# Patient Record
Sex: FEMALE | Race: WHITE | Employment: STUDENT | URBAN - NONMETROPOLITAN AREA
[De-identification: names, ages, dates, MRNs, and addresses within clinical notes are randomized per-mention and may not be internally consistent; named-entity substitution may affect disease eponyms.]

---
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
## 2023-06-15 ENCOUNTER — APPOINTMENT (OUTPATIENT)
Dept: GENERAL RADIOLOGY | Age: 18
End: 2023-06-15
Payer: COMMERCIAL

## 2023-06-15 ENCOUNTER — HOSPITAL ENCOUNTER (EMERGENCY)
Age: 18
Discharge: HOME | End: 2023-06-15
Attending: EMERGENCY MEDICINE
Payer: COMMERCIAL

## 2023-06-15 VITALS
OXYGEN SATURATION: 96 % | DIASTOLIC BLOOD PRESSURE: 89 MMHG | HEART RATE: 93 BPM | RESPIRATION RATE: 18 BRPM | SYSTOLIC BLOOD PRESSURE: 137 MMHG | TEMPERATURE: 98.2 F

## 2023-06-15 DIAGNOSIS — S83.92XA SPRAIN OF LEFT KNEE, UNSPECIFIED LIGAMENT, INITIAL ENCOUNTER: Primary | ICD-10-CM

## 2023-06-15 PROCEDURE — 73564 X-RAY EXAM KNEE 4 OR MORE: CPT

## 2023-06-15 PROCEDURE — 99283 EMERGENCY DEPT VISIT LOW MDM: CPT

## 2023-06-15 RX ORDER — IBUPROFEN 600 MG/1
600 TABLET ORAL EVERY 8 HOURS PRN
Qty: 30 TABLET | Refills: 0 | Status: SHIPPED | OUTPATIENT
Start: 2023-06-15

## 2023-06-16 NOTE — DISCHARGE INSTRUCTIONS
Return to ED if you have any return of symptoms such as knee swelling, redness, pain, leg swelling, difficulty breathing, or have any other concerns.

## 2023-06-16 NOTE — ED PROVIDER NOTES
[facility] ENCOUNTER          Pt Name: Murray Cary  MRN: [id number]  [place] 2005  Date of evaluation: 6/15/2023  Emergency Physician: Shabnam Blanchard, [facility]       Chief Complaint   Patient presents with    Knee Pain     History obtained from the patient. HISTORY OF PRESENT ILLNESS    HPI  Murray Cary is a 21 y.o. female who presents to the emergency department for evaluation of knee pain. Patient presents with left knee pain. She reports it has been going on for 3 weeks. Unknown injury at onset of symptoms. She reports she feels her knee locks and catches. Occasionally it feels like it is going to give out. She states today slipped and fell attending to go up the stairs. She states she fell onto the stair at ground-level. Due to her knee locking. No fever no redness pain is rated maximal 4 out of 10. Home treatment tried Tylenol. The patient has no other acute complaints at this time. REVIEW OF SYSTEMS   Review of Systems   Musculoskeletal:  Positive for arthralgias. Negative for gait problem and joint swelling. Skin:  Negative for wound. PAST MEDICAL AND SURGICAL HISTORY   No past medical history on file. Past Surgical History:   Procedure Laterality Date    TONSILLECTOMY           MEDICATIONS   No current facility-administered medications for this encounter.     Current Outpatient Medications:     ibuprofen (ADVIL;MOTRIN) 600 MG tablet, Take 1 tablet by mouth every 8 hours as needed for Pain, Disp: 30 tablet, Rfl: 0    norethindrone-ethinyl estradiol (JUNEL FE 1/20) 1-20 MG-MCG per tablet, Take 1 tablet by mouth daily, Disp: , Rfl:     lamoTRIgine (LAMICTAL) 100 MG tablet, Take 100 mg by mouth daily, Disp: , Rfl:     traZODone (DESYREL) 100 MG tablet, Take 100 mg by mouth nightly, Disp: , Rfl:     citalopram (CELEXA) 20 MG tablet, Take 25 mg by mouth daily, Disp: , Rfl:       SOCIAL HISTORY

## 2023-06-16 NOTE — ED NOTES
Pt in through ED lobby. She states 3 days ago she began having left knee pain and swelling without known injury. Tonight she fell down 2 steps on to her left knee and pain has been more severe since.       Surya Martinez RN  06/15/23 [id number]

## 2023-08-02 ENCOUNTER — HOSPITAL ENCOUNTER (EMERGENCY)
Age: 18
Discharge: HOME | End: 2023-08-02
Payer: COMMERCIAL

## 2023-08-02 VITALS
TEMPERATURE: 98.3 F | WEIGHT: 270 LBS | DIASTOLIC BLOOD PRESSURE: 82 MMHG | OXYGEN SATURATION: 98 % | RESPIRATION RATE: 18 BRPM | SYSTOLIC BLOOD PRESSURE: 142 MMHG | HEART RATE: 93 BPM | BODY MASS INDEX: 42.29 KG/M2

## 2023-08-02 DIAGNOSIS — J01.10 ACUTE FRONTAL SINUSITIS, RECURRENCE NOT SPECIFIED: Primary | ICD-10-CM

## 2023-08-02 DIAGNOSIS — J40 BRONCHITIS: ICD-10-CM

## 2023-08-02 PROCEDURE — 99213 OFFICE O/P EST LOW 20 MIN: CPT

## 2023-08-02 PROCEDURE — 99213 OFFICE O/P EST LOW 20 MIN: CPT | Performed by: NURSE PRACTITIONER

## 2023-08-02 RX ORDER — DOXYCYCLINE HYCLATE 100 MG/1
100 CAPSULE ORAL 2 TIMES DAILY
Qty: 14 CAPSULE | Refills: 0 | Status: SHIPPED | OUTPATIENT
Start: 2023-08-02 | End: 2023-08-09

## 2023-08-02 RX ORDER — PREDNISONE 20 MG/1
20 TABLET ORAL 2 TIMES DAILY
Qty: 10 TABLET | Refills: 0 | Status: SHIPPED | OUTPATIENT
Start: 2023-08-02 | End: 2023-08-07

## 2023-08-02 ASSESSMENT — ENCOUNTER SYMPTOMS
RHINORRHEA: 1
DIARRHEA: 0
VOMITING: 0
COUGH: 1
CHEST TIGHTNESS: 1
SINUS PRESSURE: 1
SHORTNESS OF BREATH: 0
NAUSEA: 0
SORE THROAT: 1

## 2023-08-02 ASSESSMENT — PAIN - FUNCTIONAL ASSESSMENT: PAIN_FUNCTIONAL_ASSESSMENT: NONE - DENIES PAIN

## 2023-08-02 NOTE — ED TRIAGE NOTES
Patient to room with c/o productive cough, head congestion, and sinus pressure beginning ten days ago.

## 2023-12-27 ENCOUNTER — HOSPITAL ENCOUNTER (EMERGENCY)
Age: 18
Discharge: HOME | End: 2023-12-27
Payer: COMMERCIAL

## 2023-12-27 VITALS
BODY MASS INDEX: 41.75 KG/M2 | HEART RATE: 73 BPM | SYSTOLIC BLOOD PRESSURE: 122 MMHG | WEIGHT: 266 LBS | TEMPERATURE: 98.5 F | RESPIRATION RATE: 18 BRPM | OXYGEN SATURATION: 100 % | HEIGHT: 67 IN | DIASTOLIC BLOOD PRESSURE: 74 MMHG

## 2023-12-27 DIAGNOSIS — B34.9 VIRAL SYNDROME: Primary | ICD-10-CM

## 2023-12-27 PROCEDURE — 99213 OFFICE O/P EST LOW 20 MIN: CPT

## 2023-12-27 RX ORDER — PREDNISONE 10 MG/1
10 TABLET ORAL 2 TIMES DAILY
Qty: 6 TABLET | Refills: 0 | Status: SHIPPED | OUTPATIENT
Start: 2023-12-27 | End: 2023-12-30

## 2023-12-27 ASSESSMENT — PAIN - FUNCTIONAL ASSESSMENT: PAIN_FUNCTIONAL_ASSESSMENT: NONE - DENIES PAIN

## 2023-12-27 NOTE — ED PROVIDER NOTES
[address]  Urgent Care Encounter      CHIEF COMPLAINT       Chief Complaint   Patient presents with    Cough     1 week    Nasal Congestion     X1 week     Emesis     X1 week     Letter for School/Work       Nurses Notes reviewed and I agree except as noted in the HPI. HISTORY OF PRESENT ILLNESS   Allyson Akers is a 19 y.o. female who presents with request for work slip after being ill for several days with cough congestion, states she vomited a few days ago. No other symptoms. REVIEW OF SYSTEMS     Review of Systems   Constitutional:  Positive for fatigue. Negative for activity change and fever. HENT:  Positive for congestion. Negative for ear pain, rhinorrhea, sore throat and trouble swallowing. Eyes:  Negative for pain, discharge and redness. Respiratory:  Positive for cough. Negative for shortness of breath and wheezing. Cardiovascular: Negative. Gastrointestinal:  Negative for abdominal pain, constipation, diarrhea, nausea and vomiting. Endocrine: Negative. Genitourinary:  Negative for dysuria, frequency and urgency. Musculoskeletal:  Negative for arthralgias, back pain and myalgias. Skin:  Negative for rash. Allergic/Immunologic: Negative. Neurological:  Negative for dizziness, tremors, weakness and headaches. Hematological: Negative. Psychiatric/Behavioral:  Negative for dysphoric mood and sleep disturbance. The patient is not nervous/anxious. PAST MEDICAL HISTORY   History reviewed. No pertinent past medical history. SURGICAL HISTORY     Patient  has a past surgical history that includes Tonsillectomy.     CURRENT MEDICATIONS       Previous Medications    CITALOPRAM (CELEXA) 20 MG TABLET    Take 25 mg by mouth daily    IBUPROFEN (ADVIL;MOTRIN) 600 MG TABLET    Take 1 tablet by mouth every 8 hours as needed for Pain    LAMOTRIGINE (LAMICTAL) 100 MG TABLET    Take 100 mg by mouth daily    NORETHINDRONE-ETHINYL ESTRADIOL (JUNEL FE 1/20)

## 2023-12-27 NOTE — ED NOTES
Patient presents to [facility] by self with complaints of emesis, cough, nasal congestion x1 week- reports calling off work and would like a work slip for today      Jef Grady RN  12/27/23 [id number]

## 2024-01-17 ENCOUNTER — HOSPITAL ENCOUNTER (EMERGENCY)
Age: 19
Discharge: HOME | End: 2024-01-17
Payer: COMMERCIAL

## 2024-01-17 VITALS
BODY MASS INDEX: 41.66 KG/M2 | OXYGEN SATURATION: 95 % | WEIGHT: 266 LBS | SYSTOLIC BLOOD PRESSURE: 132 MMHG | DIASTOLIC BLOOD PRESSURE: 85 MMHG | RESPIRATION RATE: 20 BRPM | TEMPERATURE: 99.1 F | HEART RATE: 96 BPM

## 2024-01-17 DIAGNOSIS — J06.9 VIRAL URI WITH COUGH: Primary | ICD-10-CM

## 2024-01-17 DIAGNOSIS — Z20.828 RSV EXPOSURE: ICD-10-CM

## 2024-01-17 LAB
FLUAV RNA RESP QL NAA+PROBE: NOT DETECTED
FLUBV RNA RESP QL NAA+PROBE: NOT DETECTED
SARS-COV-2 RNA RESP QL NAA+PROBE: NOT DETECTED

## 2024-01-17 PROCEDURE — 6370000000 HC RX 637 (ALT 250 FOR IP): Performed by: PHYSICIAN ASSISTANT

## 2024-01-17 PROCEDURE — 99283 EMERGENCY DEPT VISIT LOW MDM: CPT

## 2024-01-17 PROCEDURE — 87636 SARSCOV2 & INF A&B AMP PRB: CPT

## 2024-01-17 RX ORDER — GUAIFENESIN AND PSEUDOEPHEDRINE HCL 1200; 120 MG/1; MG/1
1 TABLET, EXTENDED RELEASE ORAL 2 TIMES DAILY
Qty: 14 TABLET | Refills: 0 | Status: SHIPPED | OUTPATIENT
Start: 2024-01-17

## 2024-01-17 RX ORDER — BENZONATATE 100 MG/1
200 CAPSULE ORAL 3 TIMES DAILY PRN
Qty: 21 CAPSULE | Refills: 0 | Status: SHIPPED | OUTPATIENT
Start: 2024-01-17 | End: 2024-01-24

## 2024-01-17 RX ORDER — ACETAMINOPHEN 325 MG/1
650 TABLET ORAL ONCE
Status: COMPLETED | OUTPATIENT
Start: 2024-01-17 | End: 2024-01-17

## 2024-01-17 RX ADMIN — ACETAMINOPHEN 650 MG: 325 TABLET ORAL at 20:32

## 2024-01-17 ASSESSMENT — PAIN DESCRIPTION - DESCRIPTORS: DESCRIPTORS: ACHING

## 2024-01-17 ASSESSMENT — PAIN DESCRIPTION - PAIN TYPE: TYPE: ACUTE PAIN

## 2024-01-17 ASSESSMENT — PAIN SCALES - GENERAL: PAINLEVEL_OUTOF10: 7

## 2024-01-17 ASSESSMENT — PAIN - FUNCTIONAL ASSESSMENT: PAIN_FUNCTIONAL_ASSESSMENT: 0-10

## 2024-01-17 ASSESSMENT — PAIN DESCRIPTION - LOCATION: LOCATION: GENERALIZED

## 2024-01-18 NOTE — ED NOTES
Pt to ED with c/o cough and headache as well as a host of other symptoms x3 days. Pt was exposed to child who tested positive with RSV and pneumonia. Pt swabbed for covid/flu and sent to lab. Denies taking anything for symptoms.

## 2024-01-18 NOTE — DISCHARGE INSTRUCTIONS
Alternate Tylenol 650 mg every 3 hours with ibuprofen 800 mg for fever and/or pain.  Take an over-the-counter cough medicine such as Robitussin DM.

## 2024-01-18 NOTE — ED PROVIDER NOTES
[facility] EMERGENCY DEPT      Pt Name: Allyson Alfred  MRN: [id number]  Birthdate 2005  Date of evaluation: 1/17/2024  Provider: Mamie Kaminski PA-C    CHIEF COMPLAINT       Chief Complaint   Patient presents with    Cough    Headache       Nurses Notes reviewed and I agree except as noted in the HPI.      HISTORY OF PRESENT ILLNESS    Allyson Alfred is a 18 y.o. female who presents through the lobby with her fiancé and hector's mother, all also being seen as patients, for illness.  Patient reports that she and her hector's son is admitted at [facility] for RSV.  For the past 2 days she has been ill with nasal congestion, rhinorrhea, sore throat, sinus pressure, subjective fever, sweats, cough, sleep disruption, decreased appetite, headache, and myalgias.  Patient feels weak and tired.  Patient also feels dehydrated despite drinking fluids.  Patient remains patient denies vomiting, diarrhea, shortness of breath, chest pain, or other complaints.  Patient is a smoker.  Patient denies possibility of pregnancy.       PAST MEDICAL HISTORY    has no past medical history on file.    SURGICAL HISTORY      has a past surgical history that includes Tonsillectomy.    CURRENT MEDICATIONS       Previous Medications    CITALOPRAM (CELEXA) 20 MG TABLET    Take 25 mg by mouth daily    IBUPROFEN (ADVIL;MOTRIN) 600 MG TABLET    Take 1 tablet by mouth every 8 hours as needed for Pain    LAMOTRIGINE (LAMICTAL) 100 MG TABLET    Take 100 mg by mouth daily    NORETHINDRONE-ETHINYL ESTRADIOL (JUNEL FE 1/20) 1-20 MG-MCG PER TABLET    Take 1 tablet by mouth daily    TRAZODONE (DESYREL) 100 MG TABLET    Take 100 mg by mouth nightly       ALLERGIES     is allergic to amoxicillin.    FAMILY HISTORY     has no family status information on file.    family history is not on file.    SOCIAL HISTORY    reports that she has never smoked. She has been exposed to tobacco smoke. She has never used smokeless tobacco. She

## 2025-02-13 ENCOUNTER — HOSPITAL ENCOUNTER (EMERGENCY)
Age: 20
Discharge: HOME | End: 2025-02-13
Payer: COMMERCIAL

## 2025-02-13 VITALS
OXYGEN SATURATION: 98 % | DIASTOLIC BLOOD PRESSURE: 81 MMHG | SYSTOLIC BLOOD PRESSURE: 135 MMHG | HEART RATE: 112 BPM | HEIGHT: 67 IN | BODY MASS INDEX: 41.75 KG/M2 | RESPIRATION RATE: 20 BRPM | TEMPERATURE: 97.9 F | WEIGHT: 266 LBS

## 2025-02-13 DIAGNOSIS — J10.1 INFLUENZA A: Primary | ICD-10-CM

## 2025-02-13 LAB
FLUAV AG SPEC QL: POSITIVE
FLUBV AG SPEC QL: NEGATIVE

## 2025-02-13 PROCEDURE — 99213 OFFICE O/P EST LOW 20 MIN: CPT

## 2025-02-13 PROCEDURE — 87804 INFLUENZA ASSAY W/OPTIC: CPT

## 2025-02-13 RX ORDER — BROMPHENIRAMINE MALEATE, PSEUDOEPHEDRINE HYDROCHLORIDE, AND DEXTROMETHORPHAN HYDROBROMIDE 2; 30; 10 MG/5ML; MG/5ML; MG/5ML
5 SYRUP ORAL 4 TIMES DAILY PRN
Qty: 118 ML | Refills: 0 | Status: SHIPPED | OUTPATIENT
Start: 2025-02-13 | End: 2025-02-19

## 2025-02-13 ASSESSMENT — ENCOUNTER SYMPTOMS
ALLERGIC/IMMUNOLOGIC NEGATIVE: 1
VOMITING: 0
COUGH: 1
DIARRHEA: 0
NAUSEA: 0
ABDOMINAL PAIN: 1

## 2025-02-13 ASSESSMENT — PAIN DESCRIPTION - LOCATION: LOCATION: ABDOMEN

## 2025-02-13 ASSESSMENT — PAIN DESCRIPTION - ORIENTATION: ORIENTATION: LEFT;LOWER

## 2025-02-13 ASSESSMENT — PAIN SCALES - GENERAL: PAINLEVEL_OUTOF10: 5

## 2025-02-13 ASSESSMENT — PAIN - FUNCTIONAL ASSESSMENT: PAIN_FUNCTIONAL_ASSESSMENT: 0-10

## 2025-02-13 ASSESSMENT — PAIN DESCRIPTION - DESCRIPTORS: DESCRIPTORS: STABBING

## 2025-02-14 NOTE — ED NOTES
Pt with complaints of a cough, nasal congestion, chest congestion and left lower abdominal pain that started 2 days ago. States cough is productive with yellow mucus. States she has tried cough drops which have helped a little bit.     Hao Shen LPN  02/13/25 [id number]

## 2025-02-14 NOTE — ED PROVIDER NOTES
[facility] URGENT CARE  Urgent Care Encounter       CHIEF COMPLAINT       Chief Complaint   Patient presents with    Cough    Nasal Congestion    Chest Congestion    Abdominal Pain     Left lower       Nurses Notes reviewed and I agree except as noted in the HPI.  HISTORY OF PRESENT ILLNESS   Allyson Alfred is a 19 y.o. female who presents to urgent care for cough, congestion, body aches, chills and abdominal pain.  Symptoms started 2 days ago.    The history is provided by the patient. No  was used.       REVIEW OF SYSTEMS     Review of Systems   Constitutional:  Positive for chills and fever.   HENT:  Positive for congestion.    Respiratory:  Positive for cough.    Gastrointestinal:  Positive for abdominal pain (left). Negative for diarrhea, nausea and vomiting.   Genitourinary: Negative.    Musculoskeletal:  Positive for myalgias.   Skin: Negative.    Allergic/Immunologic: Negative.    Neurological: Negative.    Hematological: Negative.    Psychiatric/Behavioral: Negative.         PAST MEDICAL HISTORY   History reviewed. No pertinent past medical history.    SURGICALHISTORY     Patient  has a past surgical history that includes Tonsillectomy.    CURRENT MEDICATIONS       Previous Medications    CITALOPRAM (CELEXA) 20 MG TABLET    Take 25 mg by mouth daily    IBUPROFEN (ADVIL;MOTRIN) 600 MG TABLET    Take 1 tablet by mouth every 8 hours as needed for Pain    LAMOTRIGINE (LAMICTAL) 100 MG TABLET    Take 100 mg by mouth daily    NORETHINDRONE-ETHINYL ESTRADIOL (JUNEL FE 1/20) 1-20 MG-MCG PER TABLET    Take 1 tablet by mouth daily    PSEUDOEPHEDRINE-GUAIFENESIN 120-1200 MG TB12    Take 1 tablet by mouth 2 times daily    TRAZODONE (DESYREL) 100 MG TABLET    Take 100 mg by mouth nightly       ALLERGIES     Patient is is allergic to amoxicillin.    Patients   There is no immunization history on file for this patient.    FAMILY HISTORY     Patient's family history is not on file.    SOCIAL

## 2025-02-14 NOTE — DISCHARGE INSTRUCTIONS
Medications as prescribed.  Increase fluid intake.  Can take over-the-counter Motrin or Tylenol as needed for pain and fever.  Can take over-the-counter Zyrtec, Flonase and Coricidin HBP for congestion.  Follow-up with family doctor in 3 to 5 days.  Go to the emergency room for any difficulty breathing new or worsening symptoms.

## 2025-03-11 ENCOUNTER — HOSPITAL ENCOUNTER (EMERGENCY)
Age: 20
Discharge: HOME | End: 2025-03-11
Attending: EMERGENCY MEDICINE
Payer: COMMERCIAL

## 2025-03-11 ENCOUNTER — APPOINTMENT (OUTPATIENT)
Dept: GENERAL RADIOLOGY | Age: 20
End: 2025-03-11
Payer: COMMERCIAL

## 2025-03-11 VITALS
SYSTOLIC BLOOD PRESSURE: 130 MMHG | HEIGHT: 66 IN | BODY MASS INDEX: 41.78 KG/M2 | HEART RATE: 77 BPM | WEIGHT: 260 LBS | DIASTOLIC BLOOD PRESSURE: 78 MMHG | RESPIRATION RATE: 16 BRPM | TEMPERATURE: 98.2 F | OXYGEN SATURATION: 97 %

## 2025-03-11 DIAGNOSIS — S63.622A SPRAIN OF INTERPHALANGEAL JOINT OF LEFT THUMB, INITIAL ENCOUNTER: Primary | ICD-10-CM

## 2025-03-11 PROCEDURE — 73140 X-RAY EXAM OF FINGER(S): CPT

## 2025-03-11 PROCEDURE — 99283 EMERGENCY DEPT VISIT LOW MDM: CPT

## 2025-03-11 PROCEDURE — 6370000000 HC RX 637 (ALT 250 FOR IP): Performed by: EMERGENCY MEDICINE

## 2025-03-11 RX ORDER — IBUPROFEN 400 MG/1
400 TABLET, FILM COATED ORAL EVERY 6 HOURS PRN
Qty: 20 TABLET | Refills: 0 | Status: SHIPPED | OUTPATIENT
Start: 2025-03-11

## 2025-03-11 RX ORDER — IBUPROFEN 200 MG
400 TABLET ORAL ONCE
Status: COMPLETED | OUTPATIENT
Start: 2025-03-11 | End: 2025-03-11

## 2025-03-11 RX ADMIN — IBUPROFEN 400 MG: 200 TABLET, FILM COATED ORAL at 20:19

## 2025-03-11 ASSESSMENT — PAIN - FUNCTIONAL ASSESSMENT: PAIN_FUNCTIONAL_ASSESSMENT: 0-10

## 2025-03-11 ASSESSMENT — PAIN SCALES - GENERAL: PAINLEVEL_OUTOF10: 7

## 2025-03-11 NOTE — ED NOTES
Pt reports to the ED from home due to a thumb injury from wrestling pt brother. Pt states right thumb is swollen and is not able to move them. Pt has not taken anything for pain.    Vancomycin Dosing by Pharmacy- Cessation of Therapy    Consult to pharmacy for vancomycin dosing has been discontinued by the prescriber, pharmacy will sign off at this time.    Please call pharmacy if there are further questions or re-enter a consult if vancomycin is resumed.     Pierre Olea, AlleyD

## 2025-03-12 NOTE — ED PROVIDER NOTES
INFORMATION REVIEWED   History obtained from: chart review and the patient.    Pertinent previous and/or external records reviewed: Noncontributory.    Case discussed with specialties other than Emergency Medicine: Not Applicable      MEDICAL DECISION MAKING   Initial Assessment Summary:   19 years old female with pain to her right finger after possible hyperflexion or hyperextension injury with unclear mechanism.  She is tender on the dorsum of the interphalangeal joint but there is normal range of motion.  Please see ED course and MDM sections below for continuation and resolution of this initial assessment if applicable.    Initial plan:   Analgesia  Imaging  Observation in the ED evaluating results    Comorbid conditions pertinent to this ED encounter:  Not applicable      Differential Diagnosis includes but is not limited to:  Finger sprain      Decision Rules/Clinical Scores utilized:  Not Applicable       Code Status:  Not addressed during this ED visit    Social determinants of health impacting treatment or disposition:  Considered and not applicable       Medical Decision Making  Problems Addressed:  Sprain of interphalangeal joint of left thumb, initial encounter: acute illness or injury    Amount and/or Complexity of Data Reviewed  Radiology: ordered and independent interpretation performed.    Risk  OTC drugs.          ED COURSE   ED Medications administered this visit (None if left blank):   Medications   ibuprofen (ADVIL;MOTRIN) tablet 400 mg (has no administration in time range)              MEDICATION CHANGES     New Prescriptions    IBUPROFEN (IBU) 400 MG TABLET    Take 1 tablet by mouth every 6 hours as needed for Pain       FINAL DISPOSITION   Shared Decision-Making was performed, disposition discussed with the patient/family and questions answered.      Outpatient follow up (If applicable):  [facility] [address] [place] [id number] [phone]  Schedule an appointment as

## 2025-05-19 ENCOUNTER — APPOINTMENT (OUTPATIENT)
Dept: ULTRASOUND IMAGING | Age: 20
End: 2025-05-19
Payer: COMMERCIAL

## 2025-05-19 ENCOUNTER — HOSPITAL ENCOUNTER (EMERGENCY)
Age: 20
Discharge: HOME | End: 2025-05-19
Payer: COMMERCIAL

## 2025-05-19 VITALS
OXYGEN SATURATION: 97 % | DIASTOLIC BLOOD PRESSURE: 80 MMHG | RESPIRATION RATE: 14 BRPM | WEIGHT: 260 LBS | BODY MASS INDEX: 41.97 KG/M2 | TEMPERATURE: 99.2 F | HEART RATE: 86 BPM | SYSTOLIC BLOOD PRESSURE: 137 MMHG

## 2025-05-19 DIAGNOSIS — Z3A.01 LESS THAN 8 WEEKS GESTATION OF PREGNANCY: Primary | ICD-10-CM

## 2025-05-19 DIAGNOSIS — O23.41 URINARY TRACT INFECTION IN MOTHER DURING FIRST TRIMESTER OF PREGNANCY: ICD-10-CM

## 2025-05-19 LAB
ALBUMIN SERPL BCG-MCNC: 4.3 G/DL (ref 3.4–4.9)
ALP SERPL-CCNC: 76 U/L (ref 38–126)
ALT SERPL W/O P-5'-P-CCNC: 24 U/L (ref 10–35)
ANION GAP SERPL CALC-SCNC: 15 MEQ/L (ref 8–16)
AST SERPL-CCNC: 24 U/L (ref 10–35)
BACTERIA URNS QL MICRO: ABNORMAL /HPF
BASOPHILS ABSOLUTE: 0.1 THOU/MM3 (ref 0–0.1)
BASOPHILS NFR BLD AUTO: 0.5 %
BILIRUB SERPL-MCNC: 0.5 MG/DL (ref 0.3–1.2)
BILIRUB UR QL STRIP.AUTO: NEGATIVE
BUN SERPL-MCNC: 5 MG/DL (ref 8–23)
CALCIUM SERPL-MCNC: 9.4 MG/DL (ref 8.6–10)
CASTS #/AREA URNS LPF: ABNORMAL /LPF
CASTS 2: ABNORMAL /LPF
CHARACTER UR: CLEAR
CHLORIDE SERPL-SCNC: 103 MEQ/L (ref 98–111)
CO2 SERPL-SCNC: 21 MEQ/L (ref 22–29)
COLOR, UA: YELLOW
CREAT SERPL-MCNC: 0.7 MG/DL (ref 0.5–0.9)
CRYSTALS URNS MICRO: ABNORMAL
DEPRECATED RDW RBC AUTO: 39.8 FL (ref 35–45)
EOSINOPHIL NFR BLD AUTO: 0.3 %
EOSINOPHILS ABSOLUTE: 0.1 THOU/MM3 (ref 0–0.4)
EPITHELIAL CELLS, UA: ABNORMAL /HPF
ERYTHROCYTE [DISTWIDTH] IN BLOOD BY AUTOMATED COUNT: 12.6 % (ref 11.5–14.5)
GFR SERPL CREATININE-BSD FRML MDRD: > 90 ML/MIN/1.73M2
GLUCOSE SERPL-MCNC: 78 MG/DL (ref 74–109)
GLUCOSE UR QL STRIP.AUTO: NEGATIVE MG/DL
HCG INTACT+B SERPL-ACNC: 2622 MIU/ML (ref 0–5)
HCG UR QL: POSITIVE
HCT VFR BLD AUTO: 43.6 % (ref 37–47)
HGB BLD-MCNC: 14.6 GM/DL (ref 12–16)
HGB UR QL STRIP.AUTO: ABNORMAL
IMM GRANULOCYTES # BLD AUTO: 0.15 THOU/MM3 (ref 0–0.07)
IMM GRANULOCYTES NFR BLD AUTO: 0.8 %
KETONES UR QL STRIP.AUTO: NEGATIVE
LYMPHOCYTES ABSOLUTE: 3.5 THOU/MM3 (ref 1–4.8)
LYMPHOCYTES NFR BLD AUTO: 18.9 %
MCH RBC QN AUTO: 29.2 PG (ref 26–33)
MCHC RBC AUTO-ENTMCNC: 33.5 GM/DL (ref 32.2–35.5)
MCV RBC AUTO: 87.2 FL (ref 81–99)
MISCELLANEOUS 2: ABNORMAL
MONOCYTES ABSOLUTE: 0.9 THOU/MM3 (ref 0.4–1.3)
MONOCYTES NFR BLD AUTO: 4.9 %
NEUTROPHILS ABSOLUTE: 13.8 THOU/MM3 (ref 1.8–7.7)
NEUTROPHILS NFR BLD AUTO: 74.6 %
NITRITE UR QL STRIP: NEGATIVE
NRBC BLD AUTO-RTO: 0 /100 WBC
OSMOLALITY SERPL CALC.SUM OF ELEC: 273.7 MOSMOL/KG (ref 275–300)
PH UR STRIP.AUTO: 6 [PH] (ref 5–9)
PLATELET # BLD AUTO: 399 THOU/MM3 (ref 130–400)
PMV BLD AUTO: 10.2 FL (ref 9.4–12.4)
POTASSIUM SERPL-SCNC: 3.5 MEQ/L (ref 3.5–5.2)
PROCALCITONIN SERPL IA-MCNC: 0.03 NG/ML (ref 0.01–0.09)
PROT SERPL-MCNC: 7.4 G/DL (ref 6.4–8.3)
PROT UR STRIP.AUTO-MCNC: NEGATIVE MG/DL
RBC # BLD AUTO: 5 MILL/MM3 (ref 4.2–5.4)
RBC URINE: ABNORMAL /HPF
RENAL EPI CELLS #/AREA URNS HPF: ABNORMAL /[HPF]
SODIUM SERPL-SCNC: 139 MEQ/L (ref 135–145)
SP GR UR REFRACT.AUTO: < 1.005 (ref 1–1.03)
UROBILINOGEN, URINE: 0.2 EU/DL (ref 0–1)
WBC # BLD AUTO: 18.5 THOU/MM3 (ref 4.8–10.8)
WBC #/AREA URNS HPF: ABNORMAL /HPF
WBC #/AREA URNS HPF: ABNORMAL /[HPF]
YEAST LIKE FUNGI URNS QL MICRO: ABNORMAL

## 2025-05-19 PROCEDURE — 36415 COLL VENOUS BLD VENIPUNCTURE: CPT

## 2025-05-19 PROCEDURE — 85025 COMPLETE CBC W/AUTO DIFF WBC: CPT

## 2025-05-19 PROCEDURE — 80053 COMPREHEN METABOLIC PANEL: CPT

## 2025-05-19 PROCEDURE — 87086 URINE CULTURE/COLONY COUNT: CPT

## 2025-05-19 PROCEDURE — 81025 URINE PREGNANCY TEST: CPT

## 2025-05-19 PROCEDURE — 84702 CHORIONIC GONADOTROPIN TEST: CPT

## 2025-05-19 PROCEDURE — 81001 URINALYSIS AUTO W/SCOPE: CPT

## 2025-05-19 PROCEDURE — 76817 TRANSVAGINAL US OBSTETRIC: CPT

## 2025-05-19 PROCEDURE — 84145 PROCALCITONIN (PCT): CPT

## 2025-05-19 PROCEDURE — 99284 EMERGENCY DEPT VISIT MOD MDM: CPT

## 2025-05-19 RX ORDER — PNV NO.95/FERROUS FUM/FOLIC AC 28MG-0.8MG
1 TABLET ORAL DAILY
Qty: 30 TABLET | Refills: 3 | Status: SHIPPED | OUTPATIENT
Start: 2025-05-19

## 2025-05-19 RX ORDER — CEPHALEXIN 500 MG/1
500 CAPSULE ORAL 3 TIMES DAILY
Qty: 21 CAPSULE | Refills: 0 | Status: SHIPPED | OUTPATIENT
Start: 2025-05-19 | End: 2025-05-26

## 2025-05-19 NOTE — ED PROVIDER NOTES
[facility] EMERGENCY DEPT      Pt Name: Allyson Alfred  MRN: [id number]  Birthdate 2005  Date of evaluation: 5/19/2025  Provider: Mamie Kaminski PA-C    CHIEF COMPLAINT       Chief Complaint   Patient presents with    Pregnancy Test       Nurses Notes reviewed and I agree except as noted in the HPI.      HISTORY OF PRESENT ILLNESS    Allyson Alfred is a 19 y.o. female who presents through the [facility] for pregnancy test.  Patient reports her last period was 3-18.  She took a pregnancy test at home and it is positive.  She would like confirmation \"due to peace of mind.\"  Patient affirms symptoms of nausea, breast tenderness, and intermittent pelvic cramping.  Patient denies vaginal bleeding, abnormal discharge, fever, chills, possibility for STD, UTI symptoms, change in appetite, or other complaints.  Patient has never been pregnant previously and has no OB/GYN    Patient was seen/examined in a hallway chair    PAST MEDICAL HISTORY    has no past medical history on file.    SURGICAL HISTORY      has a past surgical history that includes Tonsillectomy.    CURRENT MEDICATIONS       Discharge Medication List as of 5/19/2025  8:35 PM        CONTINUE these medications which have NOT CHANGED    Details   ibuprofen (IBU) 400 MG tablet Take 1 tablet by mouth every 6 hours as needed for Pain, Disp-20 tablet, R-0Normal      pseudoephedrine-guaiFENesin 120-1200 MG TB12 Take 1 tablet by mouth 2 times daily, Disp-14 tablet, R-0Normal      lamoTRIgine (LAMICTAL) 100 MG tablet Take 100 mg by mouth dailyHistorical Med      traZODone (DESYREL) 100 MG tablet Take 100 mg by mouth nightlyHistorical Med      citalopram (CELEXA) 20 MG tablet Take 25 mg by mouth dailyHistorical Med             ALLERGIES     is allergic to amoxicillin.    FAMILY HISTORY     has no family status information on file.    family history is not on file.    SOCIAL HISTORY    reports that she has never smoked. She has been exposed to tobacco smoke. She

## 2025-05-20 NOTE — ED PROVIDER NOTES
[facility]  EMERGENCY DEPARTMENT ENCOUNTER     Pt Name: Allyson Alfred  MRN: [id number]  Birthdate 2005  Date of evaluation: 5/20/25        Mid-level provider Note:    I have personally performed and/or participated in the history, exam and medical decision making and agree with all pertinent clinical information as noted by the previous provider.  I have also reviewed and agree with the past medical, family and social history unless otherwise noted.    I have personally performed a face to face diagnostic evaluation on this patient. I have reviewed the previous provider's findings and agree.      Evaluation:  I assumed care of this patient from CNIDY Hatch pending US results.  Reviewed findings with patient.  Will prescribe abx for possible UTI (could be contaminant).  Given rx for prenatals.  Provided follow up instructions.  Dc home.             US OB TRANSVAGINAL  Result Date: 5/19/2025  US OB 1st Trimester transvaginal Comparison: None Findings: Anterior measuring 9.6 cm in length. No myometrial lesions. Single intrauterine gestational sac. No yolk sac or embryo at this time. MSD: 0.6 cm. EGA: 5 weeks 2 days by mean sac diameter. LAVONNE: January 17, 2026. Previously established gestational age: N/A . No subchorionic bleed. Right ovary 3.7 x 2.5 x 1.7 cm. Left ovary 4.1 x 2.3 x 1.5 cm with peripherally enhancing corpus luteum at 1.6 x 1.5 x 1.3 cm and simple cyst measuring 1.4 x 1.3 x 1.2 cm. Normal color Doppler of both ovaries. Small amount of free fluid in the cul-de-sac.     Single intrauterine gestational sac without yolk sac or embryo at this time. Recommend continued follow-up with serial beta hCG to confirm viable early pregnancy. This document has been electronically signed by: Emerson Cortez MD on 05/19/2025 06:48 PM        DIAGNOSIS  1. Less than 8 weeks gestation of pregnancy    2. Urinary tract infection in mother during first trimester of pregnancy

## 2025-05-20 NOTE — DISCHARGE INSTRUCTIONS
?? ER Discharge Instructions: Early Pregnancy  ?? Diagnosis  You were seen in the emergency department during the early stages of pregnancy (typically before 12 weeks). Your evaluation may have been due to symptoms such as cramping, spotting, nausea, or to confirm the pregnancy.    ?? Home Care Instructions  ?? Rest & Activity  Take it easy over the next few days.  Avoid heavy lifting, vigorous exercise, and intercourse if you had any bleeding or cramping.  Gentle walking and normal daily activity are generally safe unless advised otherwise.  ?? Hydration  Drink plenty of fluids--aim for at least 8 glasses of water per day unless otherwise directed.  ??? Nutrition  Eat small, frequent meals.  Avoid undercooked meats, unpasteurized dairy, and high-mercury fish.    ?? Safe Over-the-Counter (OTC) Medications in Early Pregnancy  Below are commonly used OTC medications considered generally safe in early pregnancy:  Symptom Safe OTC Medication Notes   Pain/Fever Acetaminophen (Tylenol) Avoid ibuprofen (Advil, Motrin), aspirin, or naproxen unless prescribed.   Nausea Vitamin B6, Unisom (doxylamine succinate) Often used together; ask your provider about safe dosing.   Constipation Colace (docusate), Metamucil, Miralax Drink plenty of water.   Heartburn Tums, Rolaids, Pepcid (famotidine) Avoid Cynthia-Canaan (contains aspirin).   Cough/Cold Plain Robitussin (dextromethorphan), saline nasal spray Avoid multi-symptom cold meds, especially in the 1st trimester.   Allergies Benadryl (diphenhydramine), Claritin (loratadine), Zyrtec Use the lowest effective dose.   Diarrhea Only under doctor’s advice; avoid Pepto-Bismol (contains salicylates) Stay hydrated.   ? Always check with your OB provider before starting any new medication, especially herbal or natural remedies.    ?? When to Return to the ER  Seek immediate care if you experience:  Heavy vaginal bleeding (soaking >1 pad/hour)  Severe or worsening abdominal or pelvic

## 2025-05-22 LAB
BACTERIA UR CULT: ABNORMAL
ORGANISM: ABNORMAL

## 2025-06-02 ENCOUNTER — APPOINTMENT (OUTPATIENT)
Dept: ULTRASOUND IMAGING | Age: 20
End: 2025-06-02
Payer: COMMERCIAL

## 2025-06-02 ENCOUNTER — HOSPITAL ENCOUNTER (EMERGENCY)
Age: 20
Discharge: HOME | End: 2025-06-02
Attending: EMERGENCY MEDICINE
Payer: COMMERCIAL

## 2025-06-02 VITALS
OXYGEN SATURATION: 99 % | HEART RATE: 78 BPM | HEIGHT: 66 IN | TEMPERATURE: 98.2 F | DIASTOLIC BLOOD PRESSURE: 68 MMHG | RESPIRATION RATE: 18 BRPM | BODY MASS INDEX: 42.75 KG/M2 | WEIGHT: 266 LBS | SYSTOLIC BLOOD PRESSURE: 130 MMHG

## 2025-06-02 DIAGNOSIS — O46.8X1 SUBCHORIONIC HEMATOMA IN FIRST TRIMESTER, SINGLE OR UNSPECIFIED FETUS: Primary | ICD-10-CM

## 2025-06-02 DIAGNOSIS — O41.8X10 SUBCHORIONIC HEMATOMA IN FIRST TRIMESTER, SINGLE OR UNSPECIFIED FETUS: Primary | ICD-10-CM

## 2025-06-02 LAB
ABO GROUP BLD: NORMAL
ALBUMIN SERPL BCG-MCNC: 4.6 G/DL (ref 3.4–4.9)
ALP SERPL-CCNC: 100 U/L (ref 38–126)
ALT SERPL W/O P-5'-P-CCNC: 22 U/L (ref 10–35)
ANION GAP SERPL CALC-SCNC: 13 MEQ/L (ref 8–16)
AST SERPL-CCNC: 18 U/L (ref 10–35)
BACTERIA URNS QL MICRO: ABNORMAL /HPF
BASOPHILS ABSOLUTE: 0.1 THOU/MM3 (ref 0–0.1)
BASOPHILS NFR BLD AUTO: 0.5 %
BILIRUB SERPL-MCNC: 0.4 MG/DL (ref 0.3–1.2)
BILIRUB UR QL STRIP.AUTO: NEGATIVE
BUN SERPL-MCNC: 8 MG/DL (ref 8–23)
CALCIUM SERPL-MCNC: 10 MG/DL (ref 8.6–10)
CASTS #/AREA URNS LPF: ABNORMAL /LPF
CASTS 2: ABNORMAL /LPF
CHARACTER UR: CLEAR
CHLORIDE SERPL-SCNC: 102 MEQ/L (ref 98–111)
CO2 SERPL-SCNC: 24 MEQ/L (ref 22–29)
COLOR, UA: YELLOW
CREAT SERPL-MCNC: 0.7 MG/DL (ref 0.5–0.9)
CRYSTALS URNS MICRO: ABNORMAL
DEPRECATED RDW RBC AUTO: 39.5 FL (ref 35–45)
EOSINOPHIL NFR BLD AUTO: 0.3 %
EOSINOPHILS ABSOLUTE: 0.1 THOU/MM3 (ref 0–0.4)
EPITHELIAL CELLS, UA: ABNORMAL /HPF
ERYTHROCYTE [DISTWIDTH] IN BLOOD BY AUTOMATED COUNT: 12.5 % (ref 11.5–14.5)
GFR SERPL CREATININE-BSD FRML MDRD: > 90 ML/MIN/1.73M2
GLUCOSE SERPL-MCNC: 89 MG/DL (ref 74–109)
GLUCOSE UR QL STRIP.AUTO: NEGATIVE MG/DL
HCG INTACT+B SERPL-ACNC: ABNORMAL MIU/ML (ref 0–5)
HCT VFR BLD AUTO: 46 % (ref 37–47)
HGB BLD-MCNC: 15.5 GM/DL (ref 12–16)
HGB UR QL STRIP.AUTO: NEGATIVE
IAT IGG-SP REAG SERPL QL: NORMAL
IMM GRANULOCYTES # BLD AUTO: 0.19 THOU/MM3 (ref 0–0.07)
IMM GRANULOCYTES NFR BLD AUTO: 0.9 %
KETONES UR QL STRIP.AUTO: NEGATIVE
LYMPHOCYTES ABSOLUTE: 4.2 THOU/MM3 (ref 1–4.8)
LYMPHOCYTES NFR BLD AUTO: 18.9 %
MCH RBC QN AUTO: 29.3 PG (ref 26–33)
MCHC RBC AUTO-ENTMCNC: 33.7 GM/DL (ref 32.2–35.5)
MCV RBC AUTO: 87 FL (ref 81–99)
MISCELLANEOUS 2: ABNORMAL
MONOCYTES ABSOLUTE: 0.9 THOU/MM3 (ref 0.4–1.3)
MONOCYTES NFR BLD AUTO: 4.2 %
NEUTROPHILS ABSOLUTE: 16.6 THOU/MM3 (ref 1.8–7.7)
NEUTROPHILS NFR BLD AUTO: 75.2 %
NITRITE UR QL STRIP: NEGATIVE
NRBC BLD AUTO-RTO: 0 /100 WBC
OSMOLALITY SERPL CALC.SUM OF ELEC: 275.3 MOSMOL/KG (ref 275–300)
PH UR STRIP.AUTO: 7 [PH] (ref 5–9)
PLATELET # BLD AUTO: 409 THOU/MM3 (ref 130–400)
PMV BLD AUTO: 10.4 FL (ref 9.4–12.4)
POTASSIUM SERPL-SCNC: 3.8 MEQ/L (ref 3.5–5.2)
PROT SERPL-MCNC: 8 G/DL (ref 6.4–8.3)
PROT UR STRIP.AUTO-MCNC: NEGATIVE MG/DL
RBC # BLD AUTO: 5.29 MILL/MM3 (ref 4.2–5.4)
RBC URINE: ABNORMAL /HPF
RENAL EPI CELLS #/AREA URNS HPF: ABNORMAL /[HPF]
RH BLD: NORMAL
SODIUM SERPL-SCNC: 139 MEQ/L (ref 135–145)
SP GR UR REFRACT.AUTO: 1.01 (ref 1–1.03)
UROBILINOGEN, URINE: 0.2 EU/DL (ref 0–1)
WBC # BLD AUTO: 22.1 THOU/MM3 (ref 4.8–10.8)
WBC #/AREA URNS HPF: ABNORMAL /HPF
WBC #/AREA URNS HPF: ABNORMAL /[HPF]
YEAST LIKE FUNGI URNS QL MICRO: ABNORMAL

## 2025-06-02 PROCEDURE — 87086 URINE CULTURE/COLONY COUNT: CPT

## 2025-06-02 PROCEDURE — 84702 CHORIONIC GONADOTROPIN TEST: CPT

## 2025-06-02 PROCEDURE — 80053 COMPREHEN METABOLIC PANEL: CPT

## 2025-06-02 PROCEDURE — 86885 COOMBS TEST INDIRECT QUAL: CPT

## 2025-06-02 PROCEDURE — 81001 URINALYSIS AUTO W/SCOPE: CPT

## 2025-06-02 PROCEDURE — 99284 EMERGENCY DEPT VISIT MOD MDM: CPT

## 2025-06-02 PROCEDURE — 86900 BLOOD TYPING SEROLOGIC ABO: CPT

## 2025-06-02 PROCEDURE — 86901 BLOOD TYPING SEROLOGIC RH(D): CPT

## 2025-06-02 PROCEDURE — 36415 COLL VENOUS BLD VENIPUNCTURE: CPT

## 2025-06-02 PROCEDURE — 76817 TRANSVAGINAL US OBSTETRIC: CPT

## 2025-06-02 PROCEDURE — 85025 COMPLETE CBC W/AUTO DIFF WBC: CPT

## 2025-06-02 ASSESSMENT — ENCOUNTER SYMPTOMS
BLOOD IN STOOL: 0
COUGH: 0
WHEEZING: 0
ABDOMINAL DISTENTION: 0
VOMITING: 0
SHORTNESS OF BREATH: 0
DIARRHEA: 0
ABDOMINAL PAIN: 0
NAUSEA: 0

## 2025-06-02 ASSESSMENT — PAIN - FUNCTIONAL ASSESSMENT: PAIN_FUNCTIONAL_ASSESSMENT: NONE - DENIES PAIN

## 2025-06-02 NOTE — ED PROVIDER NOTES
[facility] EMERGENCY DEPARTMENT  EMERGENCY DEPARTMENT ENCOUNTER          Pt Name: Allyson Alfred  MRN: [id number]  Birthdate 2005  Date of evaluation: 6/2/2025  Physician: Mumtaz MONTAGUE DO  Supervising Attending Physician: Alberto Morgan DO       CHIEF COMPLAINT       Chief Complaint   Patient presents with    Vaginal Bleeding         HISTORY OF PRESENT ILLNESS    HPI  Allyson Alfred is a 19 y.o. female who presents to the emergency department from home, as a walk in to the ED lobby for evaluation of vaginal bleeding.  Bleeding started at approximately 1600 on 6/2.  Reports light bleeding with no passage of clots, does not need to change pads overnight, changes pads less frequently than once every 3 hours.  Denies pain with bleeding or associated abdominal cramping.  No fevers, nausea, vomiting, decreased p.o. intake, malodorous discharge, dysuria, hematuria.  Admits to increased urinary frequency however unable to state how long symptom has been going on for.  No changes in bowel movements.  Reports being 7 weeks pregnant.  Previous evaluated at [facility] ED on 5/21, given 7-day course cephalexin due to asymptomatic bacteriuria.    She has OB/GYN appointment with Dr. Lena Tanner at [facility] scheduled on 6/25/2025.  Otherwise she is only taking prenatals, Keflex.    The patient has no other acute complaints at this time.      REVIEW OF SYSTEMS   Review of Systems   Constitutional:  Positive for chills. Negative for diaphoresis, fatigue and fever.   Respiratory:  Negative for cough, shortness of breath and wheezing.    Cardiovascular:  Negative for chest pain, palpitations and leg swelling.   Gastrointestinal:  Negative for abdominal distention, abdominal pain, blood in stool, diarrhea, nausea and vomiting.   Genitourinary:  Positive for frequency and vaginal bleeding. Negative for difficulty urinating, dysuria, flank pain, hematuria, pelvic pain, urgency, vaginal discharge

## 2025-06-02 NOTE — ED TRIAGE NOTES
Presents to ED with c/o vaginal bleeding when she wiped today. Patient states she is 7 weeks pregnant. Patient states this happened a half hour ago. Alert and oriented. Respirations easy and unlabored. Denies pain.

## 2025-06-03 NOTE — DISCHARGE INSTRUCTIONS
Please continue taking the last 6 doses of your Keflex.  Please go to your OB/GYN appointment on 6/25 and have a repeat urinalysis done.

## 2025-06-04 LAB
BACTERIA UR CULT: ABNORMAL
ORGANISM: ABNORMAL